# Patient Record
Sex: FEMALE | Race: WHITE | NOT HISPANIC OR LATINO | URBAN - NONMETROPOLITAN AREA
[De-identification: names, ages, dates, MRNs, and addresses within clinical notes are randomized per-mention and may not be internally consistent; named-entity substitution may affect disease eponyms.]

---

## 2019-06-12 ENCOUNTER — IMPORTED ENCOUNTER (OUTPATIENT)
Dept: URBAN - NONMETROPOLITAN AREA CLINIC 1 | Facility: CLINIC | Age: 59
End: 2019-06-12

## 2019-06-12 PROBLEM — H52.03: Noted: 2019-06-12

## 2019-06-12 PROBLEM — H52.223: Noted: 2019-06-12

## 2019-06-12 PROBLEM — H52.4: Noted: 2019-06-12

## 2019-06-12 PROCEDURE — S0621 ROUTINE OPHTHALMOLOGICAL EXA: HCPCS

## 2019-06-12 NOTE — PATIENT DISCUSSION
Compound Hyperopic Astigmatism OU w/Presbyopia-  discussed findings w/patient-  new spectacle Rx issued-  continue to monitor yearly or prnCataracts OU-  discussed findings w/patient-  no treatment indicated at this time-  continue to monitor yearly or prn

## 2019-11-02 ENCOUNTER — IMPORTED ENCOUNTER (OUTPATIENT)
Dept: URBAN - NONMETROPOLITAN AREA CLINIC 1 | Facility: CLINIC | Age: 59
End: 2019-11-02

## 2019-11-02 PROBLEM — H25.13: Noted: 2019-11-02

## 2019-11-02 PROCEDURE — 92014 COMPRE OPH EXAM EST PT 1/>: CPT

## 2019-11-02 NOTE — PATIENT DISCUSSION
Cataract(s)-Visually significant cataract OU-Cataract(s) causing symptomatic impairment of visual function not correctable with a tolerable change in glasses or contact lenses lighting or non-operative means resulting in specific activity limitations and/or participation restrictions including but not limited to reading viewing television driving or meeting vocational or recreational needs. -Expectation is clearer vision and functional improvement in symptoms as well as reduced glare disability after cataract removal.-Order IOLMaster and OPD today. -Recommend Standard/Traditional based on today's OPD testing and lifestyle questionnaire.-All questions were answered regarding surgery including pre and post-op medications appointments activity restrictions and anesthetic usage.-The risks benefits and alternatives and special risk factors for the patient were discussed in detail including but not limited to: bleeding infection retinal detachment vitreous loss problems with the implant and possible need for additional surgery.-Although rare the possibility of complete vision loss was discussed.-The possible need for glasses post-operatively was discussed.-Order medical clearance exam based on history of COPD and HBP-Patient elects to proceed with cataract surgery OD . Will schedule at patient's convenience and re-evaluate OS  in the future.

## 2020-01-14 PROBLEM — H25.13: Noted: 2020-01-14

## 2020-01-21 ENCOUNTER — IMPORTED ENCOUNTER (OUTPATIENT)
Dept: URBAN - NONMETROPOLITAN AREA CLINIC 1 | Facility: CLINIC | Age: 60
End: 2020-01-21

## 2020-02-07 PROBLEM — H25.13: Noted: 2020-02-07

## 2020-02-10 ENCOUNTER — IMPORTED ENCOUNTER (OUTPATIENT)
Dept: URBAN - NONMETROPOLITAN AREA CLINIC 1 | Facility: CLINIC | Age: 60
End: 2020-02-10

## 2020-02-10 PROBLEM — H25.12: Noted: 2020-02-10

## 2020-02-10 PROBLEM — Z98.41: Noted: 2020-02-10

## 2020-02-10 PROCEDURE — 66984 XCAPSL CTRC RMVL W/O ECP: CPT

## 2020-02-10 PROCEDURE — 92136 OPHTHALMIC BIOMETRY: CPT

## 2020-02-10 PROCEDURE — 99024 POSTOP FOLLOW-UP VISIT: CPT

## 2020-02-10 NOTE — PATIENT DISCUSSION
s/p PCIOL-Pt doing well s/p PCIOL. -Continue post-op gtts according to instruction sheet and sleep with eye shield over eye for 7 nights.-Avoid bending at the waist lifting anything over 5lbs and dirty or teto environments.

## 2020-02-17 ENCOUNTER — IMPORTED ENCOUNTER (OUTPATIENT)
Dept: URBAN - NONMETROPOLITAN AREA CLINIC 1 | Facility: CLINIC | Age: 60
End: 2020-02-17

## 2020-02-17 PROBLEM — E78.5: Noted: 2020-02-17

## 2020-02-17 PROBLEM — H25.12: Noted: 2020-02-10

## 2020-02-17 PROBLEM — Z01.818: Noted: 2020-02-17

## 2020-02-17 PROBLEM — I10: Noted: 2020-02-17

## 2020-02-17 PROCEDURE — 99024 POSTOP FOLLOW-UP VISIT: CPT

## 2020-02-17 NOTE — PATIENT DISCUSSION
Cataract(s)-Visually significant cataract OS . -Cataract(s) causing symptomatic impairment of visual function not correctable with a tolerable change in glasses or contact lenses lighting or non-operative means resulting in specific activity limitations and/or participation restrictions including but not limited to reading viewing television driving or meeting vocational or recreational needs. -Expectation is clearer vision and functional improvement in symptoms as well as reduced glare disability after cataract removal.-Recommend Stand/Trad based on previous OPD testing and lifestyle questionnaire.-All questions were answered regarding surgery including pre and post-op medications appointments activity restrictions and anesthetic usage.-The risks benefits and alternatives and special risk factors for the patient were discussed in detail including but not limited to: bleeding infection retinal detachment vitreous loss problems with the implant and possible need for additional surgery.-Although rare the possibility of complete vision loss was discussed.-The need for glasses post-operatively was discussed.-Patient elects to proceed with cataract surgery OS . Will schedule at patient's convenience. s/p PCIOL-Pt doing well at 1 week s/p PCIOL. -Continue post-op gtts according to instruction sheet.-Okay to resume usual activites and d/c eye shield.
Detail Level: Detailed

## 2020-02-27 ENCOUNTER — IMPORTED ENCOUNTER (OUTPATIENT)
Dept: URBAN - NONMETROPOLITAN AREA CLINIC 1 | Facility: CLINIC | Age: 60
End: 2020-02-27

## 2020-02-27 PROBLEM — Z98.41: Noted: 2020-02-27

## 2020-02-27 PROBLEM — Z98.42: Noted: 2020-02-27

## 2020-02-27 NOTE — PATIENT DISCUSSION
1 day POV CE OS Standard/Traditional- The pt has undergone successful cataract extraction with Intraocular lens implantation in the left eye.- PO examination is normal and visual acuity has improved. - Instructed patient not to rub the eye and don't go swimming. - Pt should return in 1 week for follow up. - Ocular meds plan discussed and patient received printed take home instructions.

## 2020-03-10 ENCOUNTER — IMPORTED ENCOUNTER (OUTPATIENT)
Dept: URBAN - NONMETROPOLITAN AREA CLINIC 1 | Facility: CLINIC | Age: 60
End: 2020-03-10

## 2020-03-10 NOTE — PATIENT DISCUSSION
s/p PC IOL OS Stand/Trad 2/26/2020-  discussed findings w/patient-  Pt doing well at 1 week s/p PCIOL. -  Continue post-op gtts according to instruction sheet.-  Okay to resume usual activites and d/c eye shield. -  RTC 2 weeks PORM or prn; [place] Notes: MR RAMÍREZ

## 2020-04-06 ENCOUNTER — IMPORTED ENCOUNTER (OUTPATIENT)
Dept: URBAN - NONMETROPOLITAN AREA CLINIC 1 | Facility: CLINIC | Age: 60
End: 2020-04-06

## 2020-04-06 PROCEDURE — 99024 POSTOP FOLLOW-UP VISIT: CPT

## 2020-04-06 NOTE — PATIENT DISCUSSION
s/p PC IOL OS Stand/Trad 2/26/2020-  discussed condition w/patient-  patient says that she is doing well at this time-  patient is seeing well w/o glasses she got OTC readers and those are working at this time-  patient feels that she is doing well at this time-  continue to monitor 3 mo DFE or prn; Jose J Notes: MR RAMÍREZ

## 2020-10-30 ENCOUNTER — IMPORTED ENCOUNTER (OUTPATIENT)
Dept: URBAN - NONMETROPOLITAN AREA CLINIC 1 | Facility: CLINIC | Age: 60
End: 2020-10-30

## 2020-10-30 PROBLEM — H52.223: Noted: 2022-01-09

## 2020-10-30 PROBLEM — H26.493: Noted: 2020-10-30

## 2020-10-30 PROBLEM — H16.223: Noted: 2022-01-05

## 2020-10-30 PROBLEM — H52.02: Noted: 2022-01-09

## 2020-10-30 PROBLEM — Z96.1: Noted: 2020-10-30

## 2020-10-30 PROBLEM — H52.4: Noted: 2022-01-09

## 2020-10-30 PROCEDURE — 99213 OFFICE O/P EST LOW 20 MIN: CPT

## 2020-10-30 NOTE — PATIENT DISCUSSION
Pseudophakia w/PCO OU-  discussed condition w/patient-  doing well at this time-  mild PCO OU-  continue to monitor for PORM; 's Notes: MR RAMÍREZ 10/30/2020

## 2020-12-16 ENCOUNTER — IMPORTED ENCOUNTER (OUTPATIENT)
Dept: URBAN - NONMETROPOLITAN AREA CLINIC 1 | Facility: CLINIC | Age: 60
End: 2020-12-16

## 2020-12-16 PROCEDURE — 92015 DETERMINE REFRACTIVE STATE: CPT

## 2020-12-16 NOTE — PATIENT DISCUSSION
Refraction Only-  discussed findings w/patient-  new spectacle Rx issued-  continue to monitor at 1 year Complete or prn; 's Notes: MR 12/16/2020DFE 10/30/2020

## 2021-11-18 NOTE — PATIENT DISCUSSION
NO SURGERY W/ RX: Not visually or functionally significant. Updated glasses rx given today. Patient to call with changes to vision.

## 2022-01-05 ENCOUNTER — PREPPED CHART (OUTPATIENT)
Dept: URBAN - NONMETROPOLITAN AREA CLINIC 4 | Facility: CLINIC | Age: 62
End: 2022-01-05

## 2022-01-05 ENCOUNTER — IMPORTED ENCOUNTER (OUTPATIENT)
Dept: URBAN - NONMETROPOLITAN AREA CLINIC 1 | Facility: CLINIC | Age: 62
End: 2022-01-05

## 2022-01-05 PROCEDURE — S0621 ROUTINE OPHTHALMOLOGICAL EXA: HCPCS

## 2022-01-05 NOTE — PATIENT DISCUSSION
Pseudophakia w/PCO OU-  discussed findings w/patient-  no treatment indicated at this time-  continue to monitor yearly or prnDES OU-  discussed findings w/patient-  patient has been using AT's and Visine without success-  start Restasis BID OU sent Rx into [facility] today-  RTC 3 mo f/u or prnSimple Astigmatism OD/Compound Hyperopic Astigmatism OS w/Presbyopia-  discussed findings w/patient-  new spectacle Rx issued-  continue to monitor yearly or prn; 's Notes: MR 1/5/2022DFE 1/5/2022

## 2022-04-09 ASSESSMENT — VISUAL ACUITY
OD_CC: 20/40
OD_SC: 20/40
OS_CC: 20/40
OS_GLARE: 20/50
OS_CC: 20/25
OD_GLARE: 20/50
OS_SC: 20/30
OD_CC: 20/40
OD_GLARE: 20/100
OU_CC: 20/25- SLOW
OU_SC: 20/60
OD_PH: 20/25
OD_CC: J2
OD_SC: 20/40
OS_CC: J1
OU_SC: 20/40
OD_CC: 20/40-
OD_CC: 20/40
OS_AM: 20/20
OS_GLARE: 20/100
OU_CC: 20/25
OS_CC: 20/30
OS_CC: 20/40+2
OD_PAM: 20/25
OS_GLARE: 20/50
OS_SC: 20/40
OU_CC: J1
OD_CC: 20/25
OD_PH: 20/25-
OD_PH: 20/25
OS_PH: 20/25-
OS_AM: 20/20
OD_CC: 20/30

## 2022-04-09 ASSESSMENT — TONOMETRY
OD_IOP_MMHG: 16
OD_IOP_MMHG: 16
OD_IOP_MMHG: 12
OS_IOP_MMHG: 19
OD_IOP_MMHG: 14
OS_IOP_MMHG: 14
OD_IOP_MMHG: 14
OD_IOP_MMHG: 20
OS_IOP_MMHG: 16
OS_IOP_MMHG: 16
OS_IOP_MMHG: 20
OS_IOP_MMHG: 16
OS_IOP_MMHG: 17
OD_IOP_MMHG: 14
OD_IOP_MMHG: 21

## 2025-08-05 ENCOUNTER — COMPREHENSIVE EXAM (OUTPATIENT)
Age: 65
End: 2025-08-05

## 2025-08-05 DIAGNOSIS — H52.223: ICD-10-CM

## 2025-08-05 DIAGNOSIS — Z96.1: ICD-10-CM

## 2025-08-05 DIAGNOSIS — H26.493: ICD-10-CM

## 2025-08-05 DIAGNOSIS — H52.03: ICD-10-CM

## 2025-08-05 DIAGNOSIS — H16.223: ICD-10-CM

## 2025-08-05 PROCEDURE — S0621AEC ROUTINE OPH EXAM INCLUDES REF/ EST PATIENT
